# Patient Record
Sex: MALE | URBAN - METROPOLITAN AREA
[De-identification: names, ages, dates, MRNs, and addresses within clinical notes are randomized per-mention and may not be internally consistent; named-entity substitution may affect disease eponyms.]

---

## 2023-02-01 ENCOUNTER — APPOINTMENT (RX ONLY)
Dept: URBAN - METROPOLITAN AREA CLINIC 77 | Facility: CLINIC | Age: 78
Setting detail: DERMATOLOGY
End: 2023-02-01

## 2023-02-01 DIAGNOSIS — L08.9 LOCAL INFECTION OF THE SKIN AND SUBCUTANEOUS TISSUE, UNSPECIFIED: ICD-10-CM

## 2023-02-01 DIAGNOSIS — D49.2 NEOPLASM OF UNSPECIFIED BEHAVIOR OF BONE, SOFT TISSUE, AND SKIN: ICD-10-CM

## 2023-02-01 PROCEDURE — ? COUNSELING

## 2023-02-01 PROCEDURE — ? PRESCRIPTION MEDICATION MANAGEMENT

## 2023-02-01 PROCEDURE — ? ORDER TESTS

## 2023-02-01 PROCEDURE — ? CHRONOLOGY OF PRESENT ILLNESS

## 2023-02-01 PROCEDURE — 99203 OFFICE O/P NEW LOW 30 MIN: CPT

## 2023-02-01 ASSESSMENT — LOCATION SIMPLE DESCRIPTION DERM: LOCATION SIMPLE: GROIN

## 2023-02-01 ASSESSMENT — LOCATION DETAILED DESCRIPTION DERM: LOCATION DETAILED: LEFT INGUINAL CREASE

## 2023-02-01 ASSESSMENT — LOCATION ZONE DERM: LOCATION ZONE: TRUNK

## 2023-02-01 NOTE — PROCEDURE: CHRONOLOGY OF PRESENT ILLNESS
Chronology Of Present Illness: 2/1/23\\nPatient reports weeping and odor from lesions in the groin region. Patient reports a previous dermatologist told him they were seborrheic keratoses and had the lesions frozen twice. Patient reports last freeze was in September. Discussed doing a biopsy on the region to have further answers. Cultured on todays exam to rule out infection. Depending on results will prescribe oral antibiotics and likely biopsy at next visit or when done with course of antibiotics. Advised patient to carefully cleanse region when washing and to apply Polysporin twice daily while waiting for culture results.
Detail Level: Zone

## 2023-02-01 NOTE — PROCEDURE: ORDER TESTS
Performing Laboratory: 0
Billing Type: [employer]
Bill For Surgical Tray: no
Expected Date Of Service: 02/01/2023

## 2023-02-01 NOTE — HPI: SKIN LESION
What Type Of Note Output Would You Prefer (Optional)?: Standard Output
How Severe Is Your Skin Lesion?: moderate
Has Your Skin Lesion Been Treated?: been treated
Is This A New Presentation, Or A Follow-Up?: Skin Lesions
When Was It Treated?: 09/2022

## 2023-02-01 NOTE — PROCEDURE: PRESCRIPTION MEDICATION MANAGEMENT
Detail Level: Simple
Plan: will determine need for oral antibiotics based on culture results.  If positive, will have to await full course of antibiotics prior to removal
Render In Strict Bullet Format?: No

## 2023-03-13 ENCOUNTER — APPOINTMENT (RX ONLY)
Dept: URBAN - METROPOLITAN AREA CLINIC 77 | Facility: CLINIC | Age: 78
Setting detail: DERMATOLOGY
End: 2023-03-13

## 2023-03-13 DIAGNOSIS — D49.2 NEOPLASM OF UNSPECIFIED BEHAVIOR OF BONE, SOFT TISSUE, AND SKIN: ICD-10-CM

## 2023-03-13 DIAGNOSIS — L64.8 OTHER ANDROGENIC ALOPECIA: ICD-10-CM

## 2023-03-13 PROCEDURE — ? PRESCRIPTION MEDICATION MANAGEMENT

## 2023-03-13 PROCEDURE — ? COUNSELING

## 2023-03-13 PROCEDURE — 99213 OFFICE O/P EST LOW 20 MIN: CPT

## 2023-03-13 PROCEDURE — ? CHRONOLOGY OF PRESENT ILLNESS

## 2023-03-13 ASSESSMENT — LOCATION DETAILED DESCRIPTION DERM: LOCATION DETAILED: LEFT INGUINAL CREASE

## 2023-03-13 ASSESSMENT — LOCATION ZONE DERM: LOCATION ZONE: TRUNK

## 2023-03-13 ASSESSMENT — LOCATION SIMPLE DESCRIPTION DERM: LOCATION SIMPLE: GROIN

## 2023-03-13 NOTE — PROCEDURE: CHRONOLOGY OF PRESENT ILLNESS
Chronology Of Present Illness: 2/1/23\\nPatient reports weeping and odor from lesions in the groin region. Patient reports a previous dermatologist told him they were seborrheic keratoses and had the lesions frozen twice. Patient reports last freeze was in September. Discussed doing a biopsy on the region to have further answers. Cultured on todays exam to rule out infection. Depending on results will prescribe oral antibiotics and likely biopsy at next visit or when done with course of antibiotics. Advised patient to carefully cleanse region when washing and to apply Polysporin twice daily while waiting for culture results. \\n\\n3/13/23\\nLesions seems to have resolved itself at todayâs exam. Culture results were negative. Possibly a normal irritated skin growth.
Detail Level: Zone

## 2023-03-13 NOTE — PROCEDURE: PRESCRIPTION MEDICATION MANAGEMENT
Render In Strict Bullet Format?: No
Detail Level: Simple
Plan: discussed oral minoxidil but do not recommend given patient's age and medical issues.  recommend minoxidil topical BID

## 2023-09-20 ENCOUNTER — APPOINTMENT (RX ONLY)
Dept: URBAN - METROPOLITAN AREA CLINIC 77 | Facility: CLINIC | Age: 78
Setting detail: DERMATOLOGY
End: 2023-09-20

## 2023-09-20 DIAGNOSIS — L40.0 PSORIASIS VULGARIS: ICD-10-CM | Status: INADEQUATELY CONTROLLED

## 2023-09-20 DIAGNOSIS — D22 MELANOCYTIC NEVI: ICD-10-CM

## 2023-09-20 DIAGNOSIS — L57.8 OTHER SKIN CHANGES DUE TO CHRONIC EXPOSURE TO NONIONIZING RADIATION: ICD-10-CM

## 2023-09-20 DIAGNOSIS — L30.4 ERYTHEMA INTERTRIGO: ICD-10-CM

## 2023-09-20 PROBLEM — D22.5 MELANOCYTIC NEVI OF TRUNK: Status: ACTIVE | Noted: 2023-09-20

## 2023-09-20 PROCEDURE — ? PRESCRIPTION MEDICATION MANAGEMENT

## 2023-09-20 PROCEDURE — 99214 OFFICE O/P EST MOD 30 MIN: CPT

## 2023-09-20 PROCEDURE — ? CHRONOLOGY OF PRESENT ILLNESS

## 2023-09-20 PROCEDURE — ? PRESCRIPTION

## 2023-09-20 PROCEDURE — ? COUNSELING

## 2023-09-20 RX ORDER — CLOBETASOL PROPIONATE 0.5 MG/G
CREAM TOPICAL
Qty: 60 | Refills: 0 | Status: ERX | COMMUNITY
Start: 2023-09-20

## 2023-09-20 RX ORDER — NYSTATIN CREAM 100000 [USP'U]/G
CREAM TOPICAL BID
Qty: 30 | Refills: 1 | Status: ERX

## 2023-09-20 RX ORDER — HYDROCORTISONE 25 MG/G
CREAM TOPICAL BID
Qty: 30 | Refills: 1 | Status: ERX | COMMUNITY
Start: 2023-09-20

## 2023-09-20 RX ADMIN — CLOBETASOL PROPIONATE 1: 0.5 CREAM TOPICAL at 00:00

## 2023-09-20 RX ADMIN — HYDROCORTISONE 1: 25 CREAM TOPICAL at 00:00

## 2023-09-20 ASSESSMENT — LOCATION SIMPLE DESCRIPTION DERM
LOCATION SIMPLE: RIGHT PLANTAR SURFACE
LOCATION SIMPLE: LEFT UPPER BACK
LOCATION SIMPLE: LEFT ANKLE
LOCATION SIMPLE: ABDOMEN
LOCATION SIMPLE: CHEST
LOCATION SIMPLE: LEFT CHEEK
LOCATION SIMPLE: LEFT PLANTAR SURFACE
LOCATION SIMPLE: RIGHT THIGH
LOCATION SIMPLE: LEFT THIGH
LOCATION SIMPLE: LEFT ANTERIOR NECK
LOCATION SIMPLE: RIGHT HAND
LOCATION SIMPLE: RIGHT UPPER BACK
LOCATION SIMPLE: LEFT HAND

## 2023-09-20 ASSESSMENT — LOCATION DETAILED DESCRIPTION DERM
LOCATION DETAILED: EPIGASTRIC SKIN
LOCATION DETAILED: RIGHT MEDIAL UPPER BACK
LOCATION DETAILED: LEFT ANTERIOR PROXIMAL THIGH
LOCATION DETAILED: RIGHT MEDIAL PLANTAR MIDFOOT
LOCATION DETAILED: STERNUM
LOCATION DETAILED: LEFT ANKLE
LOCATION DETAILED: LEFT INFERIOR UPPER BACK
LOCATION DETAILED: LEFT INFERIOR PREAURICULAR CHEEK
LOCATION DETAILED: LEFT THENAR EMINENCE
LOCATION DETAILED: LEFT CLAVICULAR NECK
LOCATION DETAILED: RIGHT THENAR EMINENCE
LOCATION DETAILED: LEFT MEDIAL PLANTAR MIDFOOT
LOCATION DETAILED: RIGHT ANTERIOR PROXIMAL THIGH

## 2023-09-20 ASSESSMENT — LOCATION ZONE DERM
LOCATION ZONE: FEET
LOCATION ZONE: HAND
LOCATION ZONE: NECK
LOCATION ZONE: FACE
LOCATION ZONE: LEG
LOCATION ZONE: TRUNK

## 2023-09-20 NOTE — PROCEDURE: CHRONOLOGY OF PRESENT ILLNESS
Detail Level: Zone
Chronology Of Present Illness: 9/20/23\\n\\nPatient flaring on todays exam. Pt notes to currently having Clobetasol but notes only using once a week for about 10 days. Advised pt to increase to BID for up to a month. Pt was previously given keto cream to use on bottom of left foot. KOH was negative. Advised to use Clobetasol instead. Will f/u in 1 month.

## 2023-09-20 NOTE — PROCEDURE: PRESCRIPTION MEDICATION MANAGEMENT
Render In Strict Bullet Format?: No
Initiate Treatment: nystatin 100,000 unit/gram topical cream BID\\nQuantity: 30.0 g  Days Supply: 30\\nSig: Apply thin layer to AA BID x up to 10 days PRN for flares.  Mix in a 1:1 ratio with hydrocortisone 2.5% cream.\\n\\nhydrocortisone 2.5 % topical cream BID\\nQuantity: 30.0 g  Days Supply: 30\\nSig: Apply thin layer to AA BID x up to 10 days Mixed 1:1 ratio with Nystatin cream - PRN for flares
Detail Level: Simple

## 2023-10-25 ENCOUNTER — APPOINTMENT (RX ONLY)
Dept: URBAN - METROPOLITAN AREA CLINIC 77 | Facility: CLINIC | Age: 78
Setting detail: DERMATOLOGY
End: 2023-10-25

## 2023-10-25 DIAGNOSIS — L30.4 ERYTHEMA INTERTRIGO: ICD-10-CM | Status: IMPROVED

## 2023-10-25 DIAGNOSIS — L40.0 PSORIASIS VULGARIS: ICD-10-CM

## 2023-10-25 PROCEDURE — ? PRESCRIPTION MEDICATION MANAGEMENT

## 2023-10-25 PROCEDURE — ? COUNSELING

## 2023-10-25 PROCEDURE — 99214 OFFICE O/P EST MOD 30 MIN: CPT

## 2023-10-25 PROCEDURE — ? CHRONOLOGY OF PRESENT ILLNESS

## 2023-10-25 ASSESSMENT — LOCATION ZONE DERM
LOCATION ZONE: HAND
LOCATION ZONE: LEG
LOCATION ZONE: FEET

## 2023-10-25 ASSESSMENT — LOCATION SIMPLE DESCRIPTION DERM
LOCATION SIMPLE: LEFT ANKLE
LOCATION SIMPLE: RIGHT HAND
LOCATION SIMPLE: RIGHT PLANTAR SURFACE
LOCATION SIMPLE: LEFT PLANTAR SURFACE
LOCATION SIMPLE: LEFT HAND

## 2023-10-25 ASSESSMENT — LOCATION DETAILED DESCRIPTION DERM
LOCATION DETAILED: LEFT THENAR EMINENCE
LOCATION DETAILED: LEFT ANKLE
LOCATION DETAILED: RIGHT THENAR EMINENCE
LOCATION DETAILED: LEFT MEDIAL PLANTAR MIDFOOT
LOCATION DETAILED: RIGHT MEDIAL PLANTAR MIDFOOT

## 2023-10-25 NOTE — PROCEDURE: PRESCRIPTION MEDICATION MANAGEMENT
Initiate Treatment: Zoryve - Apply thin layer to AA QD
Detail Level: Simple
Render In Strict Bullet Format?: No
Samples Given: Elle Duran

## 2023-10-25 NOTE — PROCEDURE: CHRONOLOGY OF PRESENT ILLNESS
Detail Level: Zone
Chronology Of Present Illness: 9/20/23\\n\\nPatient flaring on todays exam. Pt notes to currently having Clobetasol but notes only using once a week for about 10 days. Advised pt to increase to BID for up to a month. Pt was previously given keto cream to use on bottom of left foot. KOH was negative. Advised to use Clobetasol instead. Will f/u in 1 month. \\n\\n10/25/23\\nPatient reports he is not doing much better on one portion of his foot, reports experiencing a lot of pain on this area. Babs Naranjo Appears c/w psoriasis,  Gave patient sample of zoryve to use daily. Will reassess in one month.

## 2023-10-25 NOTE — PROCEDURE: COUNSELING
Detail Level: Detailed
Patient Specific Counseling (Will Not Stick From Patient To Patient): Preformed bacterial culture

## 2023-11-08 ENCOUNTER — RX ONLY (OUTPATIENT)
Age: 78
Setting detail: RX ONLY
End: 2023-11-08

## 2023-11-08 RX ORDER — CLOBETASOL PROPIONATE 0.5 MG/ML
THIN LAYER SOLUTION TOPICAL BID
Qty: 50 | Refills: 0 | Status: ERX | COMMUNITY
Start: 2023-11-08

## 2023-12-01 ENCOUNTER — APPOINTMENT (RX ONLY)
Dept: URBAN - METROPOLITAN AREA CLINIC 77 | Facility: CLINIC | Age: 78
Setting detail: DERMATOLOGY
End: 2023-12-01

## 2023-12-01 ENCOUNTER — RX ONLY (OUTPATIENT)
Age: 78
Setting detail: RX ONLY
End: 2023-12-01

## 2023-12-01 DIAGNOSIS — L30.4 ERYTHEMA INTERTRIGO: ICD-10-CM

## 2023-12-01 DIAGNOSIS — L40.0 PSORIASIS VULGARIS: ICD-10-CM | Status: WORSENING

## 2023-12-01 PROCEDURE — ? ADDITIONAL NOTES

## 2023-12-01 PROCEDURE — ? CHRONOLOGY OF PRESENT ILLNESS

## 2023-12-01 PROCEDURE — ? PRESCRIPTION MEDICATION MANAGEMENT

## 2023-12-01 PROCEDURE — ? COUNSELING

## 2023-12-01 PROCEDURE — 99214 OFFICE O/P EST MOD 30 MIN: CPT

## 2023-12-01 RX ORDER — CLOBETASOL PROPIONATE 0.5 MG/G
THIN LAYER CREAM TOPICAL AS DIRECTED
Qty: 60 | Refills: 0 | Status: ERX

## 2023-12-01 ASSESSMENT — LOCATION ZONE DERM
LOCATION ZONE: HAND
LOCATION ZONE: FEET
LOCATION ZONE: LEG

## 2023-12-01 ASSESSMENT — LOCATION DETAILED DESCRIPTION DERM
LOCATION DETAILED: RIGHT MEDIAL PLANTAR MIDFOOT
LOCATION DETAILED: RIGHT THENAR EMINENCE
LOCATION DETAILED: LEFT ANKLE
LOCATION DETAILED: LEFT MEDIAL PLANTAR MIDFOOT
LOCATION DETAILED: LEFT THENAR EMINENCE

## 2023-12-01 ASSESSMENT — LOCATION SIMPLE DESCRIPTION DERM
LOCATION SIMPLE: RIGHT PLANTAR SURFACE
LOCATION SIMPLE: LEFT HAND
LOCATION SIMPLE: LEFT PLANTAR SURFACE
LOCATION SIMPLE: LEFT ANKLE
LOCATION SIMPLE: RIGHT HAND

## 2023-12-01 ASSESSMENT — BSA PSORIASIS: % BODY COVERED IN PSORIASIS: 1

## 2023-12-01 NOTE — PROCEDURE: CHRONOLOGY OF PRESENT ILLNESS
Detail Level: Zone
Chronology Of Present Illness: 9/20/23\\n\\nPatient flaring on todays exam. Pt notes to currently having Clobetasol but notes only using once a week for about 10 days. Advised pt to increase to BID for up to a month. Pt was previously given keto cream to use on bottom of left foot. KOH was negative. Advised to use Clobetasol instead. Will f/u in 1 month. \\n\\n10/25/23\\nPatient reports he is not doing much better on one portion of his foot, reports experiencing a lot of pain on this area. Tato Trevizo Appears c/w psoriasis,  Gave patient sample of zoryve to use daily. Will reassess in one month. \\n\\n12/1/23\\nPatient is flaring on exam today. Reports improvement until Sunday when left foot flared again. Rash is pustular, flaking, and painful. Reports difficulty with walking. Patient only experiences relief with clobetasol. Did not benefit from [facility]. Discussed various treatment options including referring to outside dermatologist for Excimer laser therapy. Also discussed compounded topicals. Plan to prescribe MedRock Psoriasis II with clobetasol. Patient is concerned with cost as he is on a limited budget. Will refill clobetasol. Patient to call in 2 weeks if clobetasol does not improve rash. If not improved, plan to prescribe MedRock Psoriasis II Cream 30 gram (Coal Tar solution 5%, Clobetasol 6.60%, Salicylic Acid 5%). Will f/u in 5 weeks.

## 2023-12-01 NOTE — PROCEDURE: PRESCRIPTION MEDICATION MANAGEMENT
Continue Regimen: Clobetasol cream - Apply thin layer to AA BID x2 weeks, repeat as needed for 2 weeks/month
Detail Level: Simple
Render In Strict Bullet Format?: No

## 2023-12-01 NOTE — PROCEDURE: ADDITIONAL NOTES
Render Risk Assessment In Note?: no
Detail Level: Simple
Additional Notes: Recommended several powders including Zeasorb, GoldBond, and Desitin. Advised patient to clean with gentle body wash and rinse thoroughly, drying completely several times a day.

## 2024-01-10 ENCOUNTER — APPOINTMENT (RX ONLY)
Dept: URBAN - METROPOLITAN AREA CLINIC 77 | Facility: CLINIC | Age: 79
Setting detail: DERMATOLOGY
End: 2024-01-10

## 2024-01-10 DIAGNOSIS — L40.0 PSORIASIS VULGARIS: ICD-10-CM | Status: WORSENING

## 2024-01-10 PROCEDURE — ? COUNSELING

## 2024-01-10 PROCEDURE — ? PRESCRIPTION MEDICATION MANAGEMENT

## 2024-01-10 PROCEDURE — 99214 OFFICE O/P EST MOD 30 MIN: CPT

## 2024-01-10 PROCEDURE — ? CHRONOLOGY OF PRESENT ILLNESS

## 2024-01-10 PROCEDURE — ? ORDER TESTS

## 2024-01-10 PROCEDURE — ? PRESCRIPTION

## 2024-01-10 RX ORDER — CLOBETASOL PROPIONATE 0.5 MG/G
THIN LAYER CREAM TOPICAL BID
Qty: 120 | Refills: 3 | Status: ERX

## 2024-01-10 ASSESSMENT — BSA PSORIASIS: % BODY COVERED IN PSORIASIS: 4

## 2024-01-10 ASSESSMENT — LOCATION SIMPLE DESCRIPTION DERM
LOCATION SIMPLE: RIGHT PLANTAR SURFACE
LOCATION SIMPLE: GROIN
LOCATION SIMPLE: LEFT HAND
LOCATION SIMPLE: RIGHT HAND
LOCATION SIMPLE: LEFT PLANTAR SURFACE
LOCATION SIMPLE: LEFT ANKLE

## 2024-01-10 ASSESSMENT — LOCATION DETAILED DESCRIPTION DERM
LOCATION DETAILED: LEFT ANKLE
LOCATION DETAILED: LEFT THENAR EMINENCE
LOCATION DETAILED: RIGHT MEDIAL PLANTAR MIDFOOT
LOCATION DETAILED: RIGHT THENAR EMINENCE
LOCATION DETAILED: LEFT MEDIAL PLANTAR MIDFOOT
LOCATION DETAILED: RIGHT INGUINAL CREASE

## 2024-01-10 ASSESSMENT — LOCATION ZONE DERM
LOCATION ZONE: FEET
LOCATION ZONE: LEG
LOCATION ZONE: HAND
LOCATION ZONE: TRUNK

## 2024-01-10 NOTE — PROCEDURE: ORDER TESTS
Bill For Surgical Tray: no
Expected Date Of Service: 01/10/2024
Billing Type: Third-Party Bill
Performing Laboratory: 0

## 2024-01-10 NOTE — PROCEDURE: PRESCRIPTION MEDICATION MANAGEMENT
Continue Regimen: clobetasol 0.05 % topical cream Apply thin layer BID for two weeks of a given month. Must take breaks.
Render In Strict Bullet Format?: No
Detail Level: Detailed

## 2024-01-10 NOTE — PROCEDURE: CHRONOLOGY OF PRESENT ILLNESS
Detail Level: Zone
Chronology Of Present Illness: 9/20/23\\n\\nPatient flaring on todays exam. Pt notes to currently having Clobetasol but notes only using once a week for about 10 days. Advised pt to increase to BID for up to a month. Pt was previously given keto cream to use on bottom of left foot. KOH was negative. Advised to use Clobetasol instead. Will f/u in 1 month.\\n\\n10/25/23\\nPatient reports he is not doing much better on one portion of his foot, reports experiencing a lot of pain on this area..  Appears c/w psoriasis,  Gave patient sample of zoryve to use daily. Will reassess in one month.\\n\\n12/1/23\\nPatient is flaring on exam today. Reports improvement until Sunday when left foot flared again. Rash is pustular, flaking, and painful. Reports difficulty with walking. Patient only experiences relief with clobetasol. Did not benefit from Zoryve. Discussed various treatment options including referring to outside dermatologist for Excimer laser therapy. Also discussed compounded topicals. Plan to prescribe MedRock Psoriasis II with clobetasol.  Patient is concerned with cost as he is on a limited budget. Will refill clobetasol. Patient to call in 2 weeks if clobetasol does not improve rash. If not improved, plan to prescribe MedRock Psoriasis II Cream 30 gram (Coal Tar solution 5%, Clobetasol 0.05%, Salicylic Acid 5%). Will f/u in 5 weeks.\\n\\n01/10/2024\\nPatient is flaring on exam today - appears c/w plantar pustulosis. Presenting with irritation and itching. Reports he has been using clobetasol cream.   Rash is not at treatment goal, no improvement. Rash is flaking, pustular, and painful. Difficulty when walking. Has not experience any relief this past week. Discussed various treatment options and counseled risks and benefits- Otezla, Skyrizi, and Humira. Plan to wait for results from lab work to prescribe Skyrizi upon approval from insurance. Will refill clobetasol and continue treatment regime. Provider recommends using OTC diaper ointment for rash in groin area. Will f/u in one month.

## 2024-04-16 ENCOUNTER — APPOINTMENT (RX ONLY)
Dept: URBAN - METROPOLITAN AREA CLINIC 77 | Facility: CLINIC | Age: 79
Setting detail: DERMATOLOGY
End: 2024-04-16

## 2024-04-16 DIAGNOSIS — L40.0 PSORIASIS VULGARIS: ICD-10-CM | Status: INADEQUATELY CONTROLLED

## 2024-04-16 DIAGNOSIS — L30.4 ERYTHEMA INTERTRIGO: ICD-10-CM | Status: WORSENING

## 2024-04-16 PROCEDURE — ? PRESCRIPTION MEDICATION MANAGEMENT

## 2024-04-16 PROCEDURE — 99214 OFFICE O/P EST MOD 30 MIN: CPT

## 2024-04-16 PROCEDURE — ? PRESCRIPTION

## 2024-04-16 PROCEDURE — ? ADDITIONAL NOTES

## 2024-04-16 PROCEDURE — ? CHRONOLOGY OF PRESENT ILLNESS

## 2024-04-16 PROCEDURE — ? COUNSELING

## 2024-04-16 RX ORDER — NYSTATIN 100000 [USP'U]/G
AS DIRECTED POWDER TOPICAL BID X 2 WEEKS
Qty: 60 | Refills: 1 | Status: CANCELLED | COMMUNITY
Start: 2024-04-16

## 2024-04-16 RX ORDER — HYDROCORTISONE 25 MG/G
AS DIRECTED CREAM TOPICAL BID
Qty: 30 | Refills: 1 | Status: ERX

## 2024-04-16 RX ADMIN — NYSTATIN AS DIRECTED: 100000 POWDER TOPICAL at 00:00

## 2024-04-16 ASSESSMENT — LOCATION DETAILED DESCRIPTION DERM
LOCATION DETAILED: LEFT THENAR EMINENCE
LOCATION DETAILED: RIGHT THENAR EMINENCE
LOCATION DETAILED: LEFT ANKLE
LOCATION DETAILED: RIGHT MEDIAL PLANTAR MIDFOOT
LOCATION DETAILED: LEFT MEDIAL PLANTAR MIDFOOT
LOCATION DETAILED: RIGHT INGUINAL CREASE

## 2024-04-16 ASSESSMENT — BSA PSORIASIS: % BODY COVERED IN PSORIASIS: 3

## 2024-04-16 ASSESSMENT — LOCATION SIMPLE DESCRIPTION DERM
LOCATION SIMPLE: LEFT HAND
LOCATION SIMPLE: RIGHT HAND
LOCATION SIMPLE: RIGHT PLANTAR SURFACE
LOCATION SIMPLE: GROIN
LOCATION SIMPLE: LEFT PLANTAR SURFACE
LOCATION SIMPLE: LEFT ANKLE

## 2024-04-16 ASSESSMENT — LOCATION ZONE DERM
LOCATION ZONE: LEG
LOCATION ZONE: HAND
LOCATION ZONE: FEET
LOCATION ZONE: TRUNK

## 2024-04-16 ASSESSMENT — BSA RASH: BSA RASH: 2

## 2024-04-16 ASSESSMENT — ITCH NUMERIC RATING SCALE: HOW SEVERE IS YOUR ITCHING?: 7

## 2024-04-16 NOTE — PROCEDURE: PRESCRIPTION MEDICATION MANAGEMENT
Continue Regimen: clobetasol 0.05 % topical cream - Apply thin layer three times weekly
Render In Strict Bullet Format?: No
Detail Level: Detailed
Detail Level: Simple
Initiate Treatment: Hydrocortisone 2.5% - Mix 1:1 with nystatin. Apply thin layer to affected areas BID x2 weeks. Then stop\\nNystatin cream  - Mix 1:1 with hydrocortisone. Apply thin layer to AA BID x 2 weeks.

## 2024-04-16 NOTE — PROCEDURE: CHRONOLOGY OF PRESENT ILLNESS
Detail Level: Zone
Chronology Of Present Illness: 9/20/23\\n\\nPatient flaring on todays exam. Pt notes to currently having Clobetasol but notes only using once a week for about 10 days. Advised pt to increase to BID for up to a month. Pt was previously given keto cream to use on bottom of left foot. KOH was negative. Advised to use Clobetasol instead. Will f/u in 1 month.\\n\\n10/25/23\\nPatient reports he is not doing much better on one portion of his foot, reports experiencing a lot of pain on this area..  Appears c/w psoriasis,  Gave patient sample of zoryve to use daily. Will reassess in one month.\\n\\n12/1/23\\nPatient is flaring on exam today. Reports improvement until Sunday when left foot flared again. Rash is pustular, flaking, and painful. Reports difficulty with walking. Patient only experiences relief with clobetasol. Did not benefit from Zoryve. Discussed various treatment options including referring to outside dermatologist for Excimer laser therapy. Also discussed compounded topicals. Plan to prescribe MedRock Psoriasis II with clobetasol.  Patient is concerned with cost as he is on a limited budget. Will refill clobetasol. Patient to call in 2 weeks if clobetasol does not improve rash. If not improved, plan to prescribe MedRock Psoriasis II Cream 30 gram (Coal Tar solution 5%, Clobetasol 0.05%, Salicylic Acid 5%). Will f/u in 5 weeks.\\n\\n01/10/2024\\nPatient is flaring on exam today - appears c/w plantar pustulosis. Presenting with irritation and itching. Reports he has been using clobetasol cream.   Rash is not at treatment goal, no improvement. Rash is flaking, pustular, and painful. Difficulty when walking. Has not experience any relief this past week. Discussed various treatment options and counseled risks and benefits- Otezla, Skyrizi, and Humira. Plan to wait for results from lab work to prescribe Skyrizi upon approval from insurance. Will refill clobetasol and continue treatment regime. Provider recommends using OTC diaper ointment for rash in groin area. Will f/u in one month.\\n\\n4/16/24\\nPatient is flaring. Clobetasol provides temporary relief. Reports pain, particularly when rubbing against shoes and bike pedal while exercising. Discussed compounded medication again - patient is not able to afford. Also discussed red light therapy. Recommended trying castor oil twice daily x1 month and continuing with clobetasol 3x/week. PAtient to come back in if does not improve

## 2024-04-17 ENCOUNTER — RX ONLY (OUTPATIENT)
Age: 79
Setting detail: RX ONLY
End: 2024-04-17

## 2024-04-17 RX ORDER — CLOBETASOL PROPIONATE 0.5 MG/G
THIN LAYER CREAM TOPICAL BID
Qty: 120 | Refills: 3 | Status: ERX

## 2024-04-17 RX ORDER — NYSTATIN CREAM 100000 [USP'U]/G
CREAM TOPICAL BID
Qty: 30 | Refills: 1 | Status: ERX | COMMUNITY
Start: 2024-04-17

## 2024-04-17 RX ADMIN — NYSTATIN CREAM 1: 100000 CREAM TOPICAL at 00:00

## 2024-08-19 ENCOUNTER — RX ONLY (OUTPATIENT)
Age: 79
Setting detail: RX ONLY
End: 2024-08-19

## 2024-08-19 RX ORDER — CLOBETASOL PROPIONATE 0.5 MG/ML
THIN LAYER SOLUTION TOPICAL BID
Qty: 50 | Refills: 0 | Status: ERX

## 2024-08-20 ENCOUNTER — APPOINTMENT (RX ONLY)
Dept: URBAN - METROPOLITAN AREA CLINIC 77 | Facility: CLINIC | Age: 79
Setting detail: DERMATOLOGY
End: 2024-08-20

## 2024-08-20 DIAGNOSIS — L30.4 ERYTHEMA INTERTRIGO: ICD-10-CM

## 2024-08-20 DIAGNOSIS — L40.0 PSORIASIS VULGARIS: ICD-10-CM

## 2024-08-20 PROCEDURE — ? PRESCRIPTION MEDICATION MANAGEMENT

## 2024-08-20 PROCEDURE — 99214 OFFICE O/P EST MOD 30 MIN: CPT

## 2024-08-20 PROCEDURE — ? CHRONOLOGY OF PRESENT ILLNESS

## 2024-08-20 PROCEDURE — ? COUNSELING

## 2024-08-20 ASSESSMENT — LOCATION DETAILED DESCRIPTION DERM
LOCATION DETAILED: POSTERIOR MID-PARIETAL SCALP
LOCATION DETAILED: RIGHT THENAR EMINENCE
LOCATION DETAILED: RIGHT MEDIAL PLANTAR MIDFOOT
LOCATION DETAILED: LEFT ANKLE
LOCATION DETAILED: LEFT MEDIAL PLANTAR MIDFOOT
LOCATION DETAILED: LEFT THENAR EMINENCE
LOCATION DETAILED: RIGHT INGUINAL CREASE

## 2024-08-20 ASSESSMENT — LOCATION SIMPLE DESCRIPTION DERM
LOCATION SIMPLE: LEFT PLANTAR SURFACE
LOCATION SIMPLE: GROIN
LOCATION SIMPLE: RIGHT HAND
LOCATION SIMPLE: POSTERIOR SCALP
LOCATION SIMPLE: LEFT HAND
LOCATION SIMPLE: LEFT ANKLE
LOCATION SIMPLE: RIGHT PLANTAR SURFACE

## 2024-08-20 ASSESSMENT — LOCATION ZONE DERM
LOCATION ZONE: HAND
LOCATION ZONE: FEET
LOCATION ZONE: LEG
LOCATION ZONE: TRUNK
LOCATION ZONE: SCALP

## 2024-08-20 NOTE — PROCEDURE: CHRONOLOGY OF PRESENT ILLNESS
Detail Level: Zone
Chronology Of Present Illness: 9/20/23\\nPatient flaring on todays exam. Pt notes to currently having Clobetasol but notes only using once a week for about 10 days. Advised pt to increase to BID for up to a month. Pt was previously given keto cream to use on bottom of left foot. KOH was negative. Advised to use Clobetasol instead. Will f/u in 1 month.\\n\\n10/25/23\\nPatient reports he is not doing much better on one portion of his foot, reports experiencing a lot of pain on this area..  Appears c/w psoriasis,  Gave patient sample of zoryve to use daily. Will reassess in one month.\\n\\n12/1/23\\nPatient is flaring on exam today. Reports improvement until Sunday when left foot flared again. Rash is pustular, flaking, and painful. Reports difficulty with walking. Patient only experiences relief with clobetasol. Did not benefit from Zoryve. Discussed various treatment options including referring to outside dermatologist for Excimer laser therapy. Also discussed compounded topicals. Plan to prescribe MedRock Psoriasis II with clobetasol.  Patient is concerned with cost as he is on a limited budget. Will refill clobetasol. Patient to call in 2 weeks if clobetasol does not improve rash. If not improved, plan to prescribe MedRock Psoriasis II Cream 30 gram (Coal Tar solution 5%, Clobetasol 0.05%, Salicylic Acid 5%). Will f/u in 5 weeks.\\n\\n01/10/2024\\nPatient is flaring on exam today - appears c/w plantar pustulosis. Presenting with irritation and itching. Reports he has been using clobetasol cream.   Rash is not at treatment goal, no improvement. Rash is flaking, pustular, and painful. Difficulty when walking. Has not experience any relief this past week. Discussed various treatment options and counseled risks and benefits- Otezla, Skyrizi, and Humira. Plan to wait for results from lab work to prescribe Skyrizi upon approval from insurance. Will refill clobetasol and continue treatment regime. Provider recommends using OTC diaper ointment for rash in groin area. Will f/u in one month.\\n\\n4/16/24\\nPatient is flaring. Clobetasol provides temporary relief. Reports pain, particularly when rubbing against shoes and bike pedal while exercising. Discussed compounded medication again - patient is not able to afford. Also discussed red light therapy. Recommended trying castor oil twice daily x1 month and continuing with clobetasol 3x/week. PAtient to come back in if does not improve\\n\\n8/20/24\\nPatient reports the rash has spread throughout his scalp and some on the hands. Clobetasol solution was sent yesterday, advised to use this BID x 2-3 weeks then must cut down to 2-3x per week. Patients foot is also still flaring, notes using his castor oil and clobetasol. Patient to continue. Notes he is starting ozempic, advised this may improve the rash. Advised may call to try a compound medication, patient to consider due to cost.
Chronology Of Present Illness: 4/16/24\\nRecommended several powders including Zeasorb, GoldBond, and Desitin. Advised patient to clean with gentle body wash and rinse thoroughly, drying completely several times a day.\\n\\n8/20/24\\nPatient is flaring today, reports intermittent use of creams. Advised to be regular with his nystatin or OTC diaper rash cream.

## 2024-08-20 NOTE — PROCEDURE: PRESCRIPTION MEDICATION MANAGEMENT
Initiate Treatment: (Scalp) clobetasol 0.05 % topical solution - Apply thin layer to AA BID x 2-3 weeks, then switch to 2-3x per week\\n\\n(Feet) continue castor oil\\nClobetasol cream - apply thin layer BID x 2 weeks on, 2 weeks off
Render In Strict Bullet Format?: No
Detail Level: Detailed
Detail Level: Simple
Initiate Treatment: Hydrocortisone 2.5% - Mix 1:1 with nystatin. Apply thin layer to affected areas BID x2 weeks. Then stop\\nNystatin cream  - Mix 1:1 with hydrocortisone. Apply thin layer to AA BID x 2 weeks.

## 2024-09-20 ENCOUNTER — RX ONLY (OUTPATIENT)
Age: 79
Setting detail: RX ONLY
End: 2024-09-20

## 2024-09-20 ENCOUNTER — APPOINTMENT (RX ONLY)
Dept: URBAN - METROPOLITAN AREA CLINIC 77 | Facility: CLINIC | Age: 79
Setting detail: DERMATOLOGY
End: 2024-09-20

## 2024-09-20 DIAGNOSIS — L57.8 OTHER SKIN CHANGES DUE TO CHRONIC EXPOSURE TO NONIONIZING RADIATION: ICD-10-CM

## 2024-09-20 DIAGNOSIS — L82.1 OTHER SEBORRHEIC KERATOSIS: ICD-10-CM

## 2024-09-20 DIAGNOSIS — L40.0 PSORIASIS VULGARIS: ICD-10-CM

## 2024-09-20 DIAGNOSIS — D22 MELANOCYTIC NEVI: ICD-10-CM

## 2024-09-20 PROBLEM — D22.72 MELANOCYTIC NEVI OF LEFT LOWER LIMB, INCLUDING HIP: Status: ACTIVE | Noted: 2024-09-20

## 2024-09-20 PROBLEM — D22.71 MELANOCYTIC NEVI OF RIGHT LOWER LIMB, INCLUDING HIP: Status: ACTIVE | Noted: 2024-09-20

## 2024-09-20 PROBLEM — D22.61 MELANOCYTIC NEVI OF RIGHT UPPER LIMB, INCLUDING SHOULDER: Status: ACTIVE | Noted: 2024-09-20

## 2024-09-20 PROBLEM — D22.39 MELANOCYTIC NEVI OF OTHER PARTS OF FACE: Status: ACTIVE | Noted: 2024-09-20

## 2024-09-20 PROBLEM — D22.5 MELANOCYTIC NEVI OF TRUNK: Status: ACTIVE | Noted: 2024-09-20

## 2024-09-20 PROBLEM — D22.62 MELANOCYTIC NEVI OF LEFT UPPER LIMB, INCLUDING SHOULDER: Status: ACTIVE | Noted: 2024-09-20

## 2024-09-20 PROCEDURE — ? COUNSELING

## 2024-09-20 PROCEDURE — ? CHRONOLOGY OF PRESENT ILLNESS

## 2024-09-20 PROCEDURE — ? PRESCRIPTION MEDICATION MANAGEMENT

## 2024-09-20 PROCEDURE — 99214 OFFICE O/P EST MOD 30 MIN: CPT

## 2024-09-20 RX ORDER — CLOBETASOL PROPIONATE 0.5 MG/ML
THIN LAYER SOLUTION TOPICAL BID
Qty: 50 | Refills: 1 | Status: ERX

## 2024-09-20 RX ORDER — CLOBETASOL PROPIONATE 0.5 MG/G
THIN LAYER CREAM TOPICAL BID
Qty: 120 | Refills: 3 | Status: ERX

## 2024-09-20 RX ORDER — HYDROCORTISONE 25 MG/G
AS DIRECTED CREAM TOPICAL BID
Qty: 30 | Refills: 1 | Status: CANCELLED

## 2024-09-20 ASSESSMENT — LOCATION ZONE DERM
LOCATION ZONE: SCALP
LOCATION ZONE: FEET
LOCATION ZONE: HAND
LOCATION ZONE: FACE
LOCATION ZONE: ARM
LOCATION ZONE: LEG
LOCATION ZONE: TRUNK

## 2024-09-20 ASSESSMENT — LOCATION SIMPLE DESCRIPTION DERM
LOCATION SIMPLE: CHEST
LOCATION SIMPLE: LEFT FOREHEAD
LOCATION SIMPLE: GROIN
LOCATION SIMPLE: LEFT ANKLE
LOCATION SIMPLE: LEFT PLANTAR SURFACE
LOCATION SIMPLE: RIGHT HAND
LOCATION SIMPLE: LEFT THIGH
LOCATION SIMPLE: INFERIOR FOREHEAD
LOCATION SIMPLE: RIGHT UPPER ARM
LOCATION SIMPLE: RIGHT PLANTAR SURFACE
LOCATION SIMPLE: RIGHT THIGH
LOCATION SIMPLE: LEFT HAND
LOCATION SIMPLE: POSTERIOR SCALP
LOCATION SIMPLE: LEFT UPPER ARM

## 2024-09-20 ASSESSMENT — LOCATION DETAILED DESCRIPTION DERM
LOCATION DETAILED: STERNUM
LOCATION DETAILED: LEFT ANTERIOR PROXIMAL UPPER ARM
LOCATION DETAILED: LEFT INFERIOR MEDIAL FOREHEAD
LOCATION DETAILED: RIGHT MEDIAL PLANTAR MIDFOOT
LOCATION DETAILED: RIGHT ANTERIOR PROXIMAL UPPER ARM
LOCATION DETAILED: LEFT ANTERIOR DISTAL THIGH
LOCATION DETAILED: INFERIOR MID FOREHEAD
LOCATION DETAILED: LEFT THENAR EMINENCE
LOCATION DETAILED: RIGHT ANTERIOR DISTAL THIGH
LOCATION DETAILED: RIGHT THENAR EMINENCE
LOCATION DETAILED: POSTERIOR MID-PARIETAL SCALP
LOCATION DETAILED: LEFT ANKLE
LOCATION DETAILED: LEFT MEDIAL PLANTAR MIDFOOT
LOCATION DETAILED: RIGHT INGUINAL CREASE

## 2024-09-20 ASSESSMENT — ITCH NUMERIC RATING SCALE: HOW SEVERE IS YOUR ITCHING?: 4

## 2024-09-20 ASSESSMENT — BSA PSORIASIS: % BODY COVERED IN PSORIASIS: 6

## 2024-09-20 NOTE — PROCEDURE: PRESCRIPTION MEDICATION MANAGEMENT
Continue Regimen: (Scalp) clobetasol 0.05 % topical solution - Apply thin layer to AA BID x 2-3 weeks, then switch to 2-3x per week\\n\\n(Feet) continue castor oil\\nClobetasol cream - apply thin layer BID x 2 weeks on, 2 weeks off
Render In Strict Bullet Format?: No
Detail Level: Detailed

## 2024-09-20 NOTE — PROCEDURE: CHRONOLOGY OF PRESENT ILLNESS
Detail Level: Zone
Chronology Of Present Illness: 9/20/23\\nPatient flaring on todays exam. Pt notes to currently having Clobetasol but notes only using once a week for about 10 days. Advised pt to increase to BID for up to a month. Pt was previously given keto cream to use on bottom of left foot. KOH was negative. Advised to use Clobetasol instead. Will f/u in 1 month.\\n\\n10/25/23\\nPatient reports he is not doing much better on one portion of his foot, reports experiencing a lot of pain on this area..  Appears c/w psoriasis,  Gave patient sample of zoryve to use daily. Will reassess in one month.\\n\\n12/1/23\\nPatient is flaring on exam today. Reports improvement until Sunday when left foot flared again. Rash is pustular, flaking, and painful. Reports difficulty with walking. Patient only experiences relief with clobetasol. Did not benefit from Zoryve. Discussed various treatment options including referring to outside dermatologist for Excimer laser therapy. Also discussed compounded topicals. Plan to prescribe MedRock Psoriasis II with clobetasol.  Patient is concerned with cost as he is on a limited budget. Will refill clobetasol. Patient to call in 2 weeks if clobetasol does not improve rash. If not improved, plan to prescribe MedRock Psoriasis II Cream 30 gram (Coal Tar solution 5%, Clobetasol 0.05%, Salicylic Acid 5%). Will f/u in 5 weeks.\\n\\n01/10/2024\\nPatient is flaring on exam today - appears c/w plantar pustulosis. Presenting with irritation and itching. Reports he has been using clobetasol cream.   Rash is not at treatment goal, no improvement. Rash is flaking, pustular, and painful. Difficulty when walking. Has not experience any relief this past week. Discussed various treatment options and counseled risks and benefits- Otezla, Skyrizi, and Humira. Plan to wait for results from lab work to prescribe Skyrizi upon approval from insurance. Will refill clobetasol and continue treatment regime. Provider recommends using OTC diaper ointment for rash in groin area. Will f/u in one month.\\n\\n4/16/24\\nPatient is flaring. Clobetasol provides temporary relief. Reports pain, particularly when rubbing against shoes and bike pedal while exercising. Discussed compounded medication again - patient is not able to afford. Also discussed red light therapy. Recommended trying castor oil twice daily x1 month and continuing with clobetasol 3x/week. PAtient to come back in if does not improve\\n\\n8/20/24\\nPatient reports the rash has spread throughout his scalp and some on the hands. Clobetasol solution was sent yesterday, advised to use this BID x 2-3 weeks then must cut down to 2-3x per week. Patients foot is also still flaring, notes using his castor oil and clobetasol. Patient to continue. Notes he is starting ozempic, advised this may improve the rash. Advised may call to try a compound medication, patient to consider due to cost.\\n\\n09/20/24\\nPt reports that his psoriasis is improved but not completely resolved. Pt is flaring on the bottoms of his feet. Will refill the pt’s clobetasol solution prescription for his scalp, he is to cont clobetasol cream BID prn on his feet. . Also recommended that the pt apply his hydrocortisone cream to flaring areas. Will f/u at the pt’s next FSE. Patient would benefit from biologics  but is not able to pay for them (copay) or any other topicals so we will keep on current regimen